# Patient Record
Sex: FEMALE | Race: WHITE | NOT HISPANIC OR LATINO | Employment: FULL TIME | URBAN - METROPOLITAN AREA
[De-identification: names, ages, dates, MRNs, and addresses within clinical notes are randomized per-mention and may not be internally consistent; named-entity substitution may affect disease eponyms.]

---

## 2018-04-19 LAB — HUMAN PAPILLOMAVIRUS (HPV): NORMAL

## 2019-09-09 ENCOUNTER — OFFICE VISIT (OUTPATIENT)
Dept: FAMILY MEDICINE | Facility: CLINIC | Age: 24
End: 2019-09-09
Attending: FAMILY MEDICINE
Payer: OTHER GOVERNMENT

## 2019-09-09 VITALS
HEART RATE: 111 BPM | TEMPERATURE: 98 F | BODY MASS INDEX: 24.88 KG/M2 | SYSTOLIC BLOOD PRESSURE: 118 MMHG | DIASTOLIC BLOOD PRESSURE: 70 MMHG | HEIGHT: 63 IN | WEIGHT: 140.44 LBS | OXYGEN SATURATION: 99 %

## 2019-09-09 DIAGNOSIS — Z00.00 WELLNESS EXAMINATION: ICD-10-CM

## 2019-09-09 DIAGNOSIS — Z30.011 OCP (ORAL CONTRACEPTIVE PILLS) INITIATION: Primary | ICD-10-CM

## 2019-09-09 LAB
B-HCG UR QL: NEGATIVE
CTP QC/QA: YES

## 2019-09-09 PROCEDURE — 99999 PR PBB SHADOW E&M-NEW PATIENT-LVL III: CPT | Mod: PBBFAC,,, | Performed by: FAMILY MEDICINE

## 2019-09-09 PROCEDURE — 81025 URINE PREGNANCY TEST: CPT | Mod: PBBFAC,PO | Performed by: FAMILY MEDICINE

## 2019-09-09 PROCEDURE — 99203 OFFICE O/P NEW LOW 30 MIN: CPT | Mod: PBBFAC,PO | Performed by: FAMILY MEDICINE

## 2019-09-09 PROCEDURE — 99385 PR PREVENTIVE VISIT,NEW,18-39: ICD-10-PCS | Mod: S$PBB,,, | Performed by: FAMILY MEDICINE

## 2019-09-09 PROCEDURE — 99999 PR PBB SHADOW E&M-NEW PATIENT-LVL III: ICD-10-PCS | Mod: PBBFAC,,, | Performed by: FAMILY MEDICINE

## 2019-09-09 PROCEDURE — 99385 PREV VISIT NEW AGE 18-39: CPT | Mod: S$PBB,,, | Performed by: FAMILY MEDICINE

## 2019-09-09 RX ORDER — NORGESTIMATE AND ETHINYL ESTRADIOL 0.25-0.035
1 KIT ORAL DAILY
Qty: 30 TABLET | Refills: 11 | Status: SHIPPED | OUTPATIENT
Start: 2019-09-09 | End: 2020-09-08

## 2019-09-09 RX ORDER — ACETAMINOPHEN AND CODEINE PHOSPHATE 120; 12 MG/5ML; MG/5ML
1 SOLUTION ORAL DAILY
Refills: 8 | COMMUNITY
Start: 2019-06-13 | End: 2019-09-09

## 2019-09-09 NOTE — PROGRESS NOTES
Subjective:       Patient ID: Anat Faith is a 23 y.o. female.    Chief Complaint: Annual Exam    23 y old female here for WE. She moved from [place] 4 m ago . Works at [employer] . Would like to resume OCP. On Micronor over the last y since she was breastfeeding . She became too emotional . Current pap ( last y ) . Not on any diet  .  . Tries to keep a healthful  Diet  .     Review of Systems   Constitutional: Negative.    HENT: Negative.    Eyes: Negative.    Respiratory: Negative.    Cardiovascular: Negative.    Gastrointestinal: Negative.    Genitourinary: Negative.    Musculoskeletal: Negative.    Skin: Negative.    Hematological: Negative.        Objective:      Physical Exam   Constitutional: She is oriented to person, place, and time. She appears well-developed and well-nourished. No distress.   HENT:   Head: Normocephalic and atraumatic.   Right Ear: External ear normal.   Left Ear: External ear normal.   Mouth/Throat: No oropharyngeal exudate.   Eyes: Pupils are equal, round, and reactive to light. Conjunctivae and EOM are normal. Right eye exhibits no discharge. Left eye exhibits no discharge. No scleral icterus.   Neck: Normal range of motion. Neck supple. No JVD present. No tracheal deviation present. No thyromegaly present.   Cardiovascular: Normal rate, regular rhythm and normal heart sounds. Exam reveals no gallop and no friction rub.   No murmur heard.  Pulmonary/Chest: Effort normal and breath sounds normal. No stridor. No respiratory distress. She has no wheezes. She has no rales. She exhibits no tenderness.   Abdominal: Soft. Bowel sounds are normal. She exhibits no distension. There is no tenderness. There is no rebound and no guarding.   Musculoskeletal: Normal range of motion.   Lymphadenopathy:     She has no cervical adenopathy.   Neurological: She is alert and oriented to person, place, and time.   Skin: Skin is warm and dry. She is not diaphoretic.   Psychiatric: She has a normal  mood and affect. Her behavior is normal. Judgment and thought content normal.       Assessment:     Anat was seen today for annual exam.    Diagnoses and all orders for this visit:    OCP (oral contraceptive pills) initiation  -     POCT Urine Pregnancy    Wellness examination    Other orders  -     norgestimate-ethinyl estradiol (ORTHO-CYCLEN) 0.25-35 mg-mcg per tablet; Take 1 tablet by mouth once daily.      Plan:   Will defer labs at this time   Will obtain pap recs   Discussed and recommended healthy eating habits and lifestyle changes including daily exercise of 30 mins, low salt diet, low fat diet and weight loss/maintenance to a normal body mass index 25 or below. I recommend routine use of seatbelts. Discouraged illicit drug use including tobacco use. I recommend no alcohol use but if you decide to drink, limit to 1 drink per day for females and 2 drinks per day for males. Keep up with your yearly physical visit with age appropriate screenings, vaccinations, and labs.

## 2019-10-10 ENCOUNTER — PATIENT OUTREACH (OUTPATIENT)
Dept: ADMINISTRATIVE | Facility: HOSPITAL | Age: 24
End: 2019-10-10